# Patient Record
Sex: FEMALE | URBAN - METROPOLITAN AREA
[De-identification: names, ages, dates, MRNs, and addresses within clinical notes are randomized per-mention and may not be internally consistent; named-entity substitution may affect disease eponyms.]

---

## 2019-06-20 ENCOUNTER — APPOINTMENT (RX ONLY)
Dept: URBAN - METROPOLITAN AREA CLINIC 45 | Facility: CLINIC | Age: 24
Setting detail: DERMATOLOGY
End: 2019-06-20

## 2019-06-20 DIAGNOSIS — L30.1 DYSHIDROSIS [POMPHOLYX]: ICD-10-CM

## 2019-06-20 DIAGNOSIS — L56.2 PHOTOCONTACT DERMATITIS [BERLOQUE DERMATITIS]: ICD-10-CM

## 2019-06-20 PROCEDURE — ? PRESCRIPTION

## 2019-06-20 PROCEDURE — ? TREATMENT REGIMEN

## 2019-06-20 PROCEDURE — 99203 OFFICE O/P NEW LOW 30 MIN: CPT

## 2019-06-20 PROCEDURE — ? COUNSELING

## 2019-06-20 RX ORDER — CLOBETASOL PROPIONATE 0.5 MG/G
AEROSOL, FOAM TOPICAL
Qty: 1 | Refills: 3 | Status: ERX | COMMUNITY
Start: 2019-06-20

## 2019-06-20 RX ADMIN — CLOBETASOL PROPIONATE: 0.5 AEROSOL, FOAM TOPICAL at 18:22

## 2019-06-20 ASSESSMENT — LOCATION ZONE DERM
LOCATION ZONE: LEG
LOCATION ZONE: ARM
LOCATION ZONE: FINGER

## 2019-06-20 ASSESSMENT — LOCATION SIMPLE DESCRIPTION DERM
LOCATION SIMPLE: RIGHT RING FINGER
LOCATION SIMPLE: LEFT MIDDLE FINGER
LOCATION SIMPLE: RIGHT CALF
LOCATION SIMPLE: RIGHT FOREARM

## 2019-06-20 ASSESSMENT — LOCATION DETAILED DESCRIPTION DERM
LOCATION DETAILED: RIGHT PROXIMAL CALF
LOCATION DETAILED: RIGHT PROXIMAL DORSAL FOREARM
LOCATION DETAILED: LEFT PROXIMAL DORSAL MIDDLE FINGER
LOCATION DETAILED: RIGHT PROXIMAL DORSAL RING FINGER

## 2019-06-20 NOTE — PROCEDURE: TREATMENT REGIMEN
Detail Level: Zone
Plan: Location: bilateral hands\\nPrescribe: Olux- E Foam top qd x 30 days\\n\\nDiscussed with patient that dryness, cracked irritation is an eczematous condition,\\nThis type of eczema is usually both genetic and due to trigger factors like stress and chronic hand washing. \\nWe will calm down inflammation by starting pt on a potent topical steroid, Olux-E foam to help alleviate inflammation.\\n\\nDiscussed with pt that after applying steroid, apply Cerave MC to help reestablish a healthy skin barrier during the day and at night we want pt to use Cerave Healing Ointment.\\nAdvised pt to limit hand washing, avoid using hand  to help prevent dryness.\\nF/u as needed
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 a flare up of phytophotodermatitis.\\nDiscussed with pt that she may have come into contact with limes or celery juice.\\nDiscussed with pt that phytophotodermatitis is also known as \"lime disease\" (not to be confused with Lyme disease), or \"Leah photodermatitis\".\\nDiscussed with pt that this is a chemical reaction which makes skin hypersensitive to ultraviolet light.\\n\\nDiscussed with pt that phytophotodermatitis usually results in hyperpigmentation of the skin that often appears like a bruise, accompanied by blisters or burning.\\nDiscussed with pt that it will eventually go away.\\nDiscussed with pt that we will prescribe HQ 6% bleaching cream to help speed up the process of evening her skin tone.\\nDiscussed with pt to use this nightly on hands.\\nAdvise pt to use Cerave healing ointment daily to help reestablish healthy skin barrier.\\nF/u as needed